# Patient Record
Sex: FEMALE | Race: AMERICAN INDIAN OR ALASKA NATIVE
[De-identification: names, ages, dates, MRNs, and addresses within clinical notes are randomized per-mention and may not be internally consistent; named-entity substitution may affect disease eponyms.]

---

## 2017-10-05 ENCOUNTER — HOSPITAL ENCOUNTER (EMERGENCY)
Dept: HOSPITAL 5 - ED | Age: 57
Discharge: LEFT BEFORE DISCHARGE | End: 2017-10-05
Payer: OTHER GOVERNMENT

## 2017-10-05 VITALS — DIASTOLIC BLOOD PRESSURE: 93 MMHG | SYSTOLIC BLOOD PRESSURE: 154 MMHG

## 2017-10-05 DIAGNOSIS — R07.9: Primary | ICD-10-CM

## 2017-10-05 DIAGNOSIS — Z53.21: ICD-10-CM

## 2017-10-05 DIAGNOSIS — M54.9: ICD-10-CM

## 2017-10-05 LAB
ANION GAP SERPL CALC-SCNC: 21 MMOL/L
BASOPHILS NFR BLD AUTO: 0.4 % (ref 0–1.8)
BUN SERPL-MCNC: 11 MG/DL (ref 7–17)
BUN/CREAT SERPL: 11 %
CALCIUM SERPL-MCNC: 9.7 MG/DL (ref 8.4–10.2)
CHLORIDE SERPL-SCNC: 102.2 MMOL/L (ref 98–107)
CO2 SERPL-SCNC: 23 MMOL/L (ref 22–30)
EOSINOPHIL NFR BLD AUTO: 0.9 % (ref 0–4.3)
GLUCOSE SERPL-MCNC: 114 MG/DL (ref 65–100)
HCT VFR BLD CALC: 37.1 % (ref 30.3–42.9)
HGB BLD-MCNC: 12.7 GM/DL (ref 10.1–14.3)
MCH RBC QN AUTO: 31 PG (ref 28–32)
MCHC RBC AUTO-ENTMCNC: 34 % (ref 30–34)
MCV RBC AUTO: 89 FL (ref 79–97)
PLATELET # BLD: 193 K/MM3 (ref 140–440)
POTASSIUM SERPL-SCNC: 3.7 MMOL/L (ref 3.6–5)
RBC # BLD AUTO: 4.17 M/MM3 (ref 3.65–5.03)
SODIUM SERPL-SCNC: 142 MMOL/L (ref 137–145)
WBC # BLD AUTO: 6.9 K/MM3 (ref 4.5–11)

## 2017-10-05 PROCEDURE — 93005 ELECTROCARDIOGRAM TRACING: CPT

## 2017-10-05 PROCEDURE — 93010 ELECTROCARDIOGRAM REPORT: CPT

## 2017-10-05 PROCEDURE — 80048 BASIC METABOLIC PNL TOTAL CA: CPT

## 2017-10-05 PROCEDURE — 36415 COLL VENOUS BLD VENIPUNCTURE: CPT

## 2017-10-05 PROCEDURE — 84484 ASSAY OF TROPONIN QUANT: CPT

## 2017-10-05 PROCEDURE — 85025 COMPLETE CBC W/AUTO DIFF WBC: CPT

## 2017-12-30 ENCOUNTER — HOSPITAL ENCOUNTER (EMERGENCY)
Dept: HOSPITAL 5 - ED | Age: 57
LOS: 1 days | Discharge: HOME | End: 2017-12-31
Payer: COMMERCIAL
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
## 2017-12-30 DIAGNOSIS — S09.8XXA: Primary | ICD-10-CM

## 2017-12-30 DIAGNOSIS — S13.8XXA: ICD-10-CM

## 2017-12-30 DIAGNOSIS — I10: ICD-10-CM

## 2017-12-30 PROCEDURE — 72125 CT NECK SPINE W/O DYE: CPT

## 2017-12-30 PROCEDURE — 70450 CT HEAD/BRAIN W/O DYE: CPT

## 2017-12-31 VITALS — SYSTOLIC BLOOD PRESSURE: 141 MMHG | DIASTOLIC BLOOD PRESSURE: 82 MMHG
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
## 2018-03-09 ENCOUNTER — HOSPITAL ENCOUNTER (EMERGENCY)
Dept: HOSPITAL 5 - ED | Age: 58
Discharge: HOME | End: 2018-03-09
Payer: COMMERCIAL

## 2018-03-09 VITALS — SYSTOLIC BLOOD PRESSURE: 162 MMHG | DIASTOLIC BLOOD PRESSURE: 100 MMHG

## 2018-03-09 DIAGNOSIS — G89.29: ICD-10-CM

## 2018-03-09 DIAGNOSIS — Z88.6: ICD-10-CM

## 2018-03-09 DIAGNOSIS — I10: ICD-10-CM

## 2018-03-09 DIAGNOSIS — M54.5: Primary | ICD-10-CM

## 2018-03-09 DIAGNOSIS — Z98.51: ICD-10-CM

## 2018-03-09 PROCEDURE — 99282 EMERGENCY DEPT VISIT SF MDM: CPT

## 2018-03-09 PROCEDURE — 96372 THER/PROPH/DIAG INJ SC/IM: CPT
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
## 2018-03-29 ENCOUNTER — HOSPITAL ENCOUNTER (EMERGENCY)
Dept: HOSPITAL 5 - ED | Age: 58
Discharge: HOME | End: 2018-03-29
Payer: OTHER GOVERNMENT

## 2018-03-29 VITALS — SYSTOLIC BLOOD PRESSURE: 155 MMHG | DIASTOLIC BLOOD PRESSURE: 84 MMHG

## 2018-03-29 DIAGNOSIS — Z98.51: ICD-10-CM

## 2018-03-29 DIAGNOSIS — I10: ICD-10-CM
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
## 2018-03-29 DIAGNOSIS — S93.402A: Primary | ICD-10-CM

## 2018-03-29 PROCEDURE — 90471 IMMUNIZATION ADMIN: CPT

## 2018-03-29 PROCEDURE — 90715 TDAP VACCINE 7 YRS/> IM: CPT

## 2018-03-29 NOTE — XRAY REPORT
FINAL REPORT



EXAM:  XR ANKLE 2V LT



HISTORY:  s/p fall swollen and pain 



COMPARISON:  None available. 



FINDINGS:  

Two views of the left ankle obtained. Bony structures are intact.

Ankle mortise is preserved. No acute fracture dislocation. 



IMPRESSION:  

No acute bony abnormality.

## 2018-03-29 NOTE — XRAY REPORT
FINAL REPORT



EXAM:  XR FOOT 2V LT



HISTORY:  s/p fall swollen and pain 



COMPARISON:  Left ankle from the same date. 



FINDINGS:  

Two views of the left foot obtained. There is a well corticated

bony fragment seen on the lateral view measuring 6 x 2

millimeters likely reflecting and ossicle adjacent to the tarsal

bones. Bony structures are intact.  Joint spaces are preserved.

No acute fracture dislocation. 



IMPRESSION:  

No acute bony abnormality.
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
